# Patient Record
Sex: FEMALE | Race: WHITE | NOT HISPANIC OR LATINO | URBAN - METROPOLITAN AREA
[De-identification: names, ages, dates, MRNs, and addresses within clinical notes are randomized per-mention and may not be internally consistent; named-entity substitution may affect disease eponyms.]

---

## 2021-04-12 ENCOUNTER — IMPORTED ENCOUNTER (OUTPATIENT)
Dept: URBAN - METROPOLITAN AREA CLINIC 59 | Facility: CLINIC | Age: 71
End: 2021-04-12

## 2021-04-12 PROBLEM — H25.11 NUCLEAR SCLEROSIS CATARACT OF RT EYE: Noted: 2021-04-12

## 2021-04-12 PROBLEM — H25.12 NUCLEAR SCLEROSIS CATARACT OF LT EYE: Noted: 2021-04-12

## 2021-04-12 PROBLEM — H43.393 OTHER VITREOUS OPACITIES, BILATERAL: Noted: 2021-04-12

## 2021-04-12 PROCEDURE — 92136 OPHTHALMIC BIOMETRY: CPT

## 2021-04-12 PROCEDURE — 92004 COMPRE OPH EXAM NEW PT 1/>: CPT

## 2021-05-12 ENCOUNTER — IMPORTED ENCOUNTER (OUTPATIENT)
Dept: URBAN - METROPOLITAN AREA CLINIC 59 | Facility: CLINIC | Age: 71
End: 2021-05-12

## 2021-05-12 PROCEDURE — 66984 XCAPSL CTRC RMVL W/O ECP: CPT

## 2021-05-13 ENCOUNTER — IMPORTED ENCOUNTER (OUTPATIENT)
Dept: URBAN - METROPOLITAN AREA CLINIC 59 | Facility: CLINIC | Age: 71
End: 2021-05-13

## 2021-05-13 PROBLEM — Z96.1 PRESENCE OF PSEUDOPHAKIA: Noted: 2021-05-13

## 2021-05-13 PROCEDURE — 99024 POSTOP FOLLOW-UP VISIT: CPT

## 2021-05-13 PROCEDURE — 92136 OPHTHALMIC BIOMETRY: CPT

## 2021-05-19 ENCOUNTER — IMPORTED ENCOUNTER (OUTPATIENT)
Dept: URBAN - METROPOLITAN AREA CLINIC 59 | Facility: CLINIC | Age: 71
End: 2021-05-19

## 2021-05-19 PROCEDURE — 66984 XCAPSL CTRC RMVL W/O ECP: CPT

## 2021-05-20 ENCOUNTER — IMPORTED ENCOUNTER (OUTPATIENT)
Dept: URBAN - METROPOLITAN AREA CLINIC 59 | Facility: CLINIC | Age: 71
End: 2021-05-20

## 2021-05-20 PROBLEM — Z96.1 PRESENCE OF PSEUDOPHAKIA: Noted: 2021-05-20

## 2021-05-20 PROCEDURE — 99024 POSTOP FOLLOW-UP VISIT: CPT

## 2021-11-11 ENCOUNTER — IMPORTED ENCOUNTER (OUTPATIENT)
Dept: URBAN - METROPOLITAN AREA CLINIC 59 | Facility: CLINIC | Age: 71
End: 2021-11-11

## 2021-11-11 PROBLEM — H26.493 OTHER SECONDARY CATARACT, BILATERAL: Noted: 2021-11-11

## 2021-11-11 PROBLEM — H43.393 OTHER VITREOUS OPACITIES, BILATERAL: Noted: 2021-11-11

## 2021-11-11 PROCEDURE — 92014 COMPRE OPH EXAM EST PT 1/>: CPT

## 2023-01-19 ENCOUNTER — ESTABLISHED COMPREHENSIVE EXAM (OUTPATIENT)
Dept: URBAN - METROPOLITAN AREA CLINIC 22 | Facility: CLINIC | Age: 73
End: 2023-01-19

## 2023-01-19 DIAGNOSIS — H43.393: ICD-10-CM

## 2023-01-19 DIAGNOSIS — H26.493: ICD-10-CM

## 2023-01-19 PROCEDURE — 92014 COMPRE OPH EXAM EST PT 1/>: CPT

## 2023-01-19 ASSESSMENT — TONOMETRY
OD_IOP_MMHG: 14
OS_IOP_MMHG: 14

## 2023-01-19 ASSESSMENT — VISUAL ACUITY
OS_SC: 20/30+2
OD_SC: 20/40+1
OD_PH: 20/30+1

## 2023-10-15 ASSESSMENT — VISUAL ACUITY
OD_SC: J2
OD_CC: 20/50
OD_SC: J2
OS_SC: 20/100
OD_SC: 20/300
OD_SC: 20/150
OS_SC: J2
OS_SC: 20/200
OS_SC: 20/30-1
OD_BAT: 20/400
OD_SC: 20/50+2
OS_CC: 20/40
OD_SC: 20/25-2
OS_BAT: 20/400

## 2023-10-15 ASSESSMENT — KERATOMETRY
OD_AXISANGLE2_DEGREES: 66
OS_K1POWER_DIOPTERS: 41.59
OS_K2POWER_DIOPTERS: 42.75
OS_AXISANGLE2_DEGREES: 117
OD_AXISANGLE_DEGREES: 156
OS_AXISANGLE_DEGREES: 27
OD_K1POWER_DIOPTERS: 41.46
OD_K2POWER_DIOPTERS: 42.03

## 2023-10-15 ASSESSMENT — TONOMETRY
OD_IOP_MMHG: 18
OD_IOP_MMHG: 16
OD_IOP_MMHG: 17
OS_IOP_MMHG: 16
OS_IOP_MMHG: 18

## 2024-03-11 ENCOUNTER — ESTABLISHED COMPREHENSIVE EXAM (OUTPATIENT)
Dept: URBAN - METROPOLITAN AREA CLINIC 22 | Facility: CLINIC | Age: 74
End: 2024-03-11

## 2024-03-11 DIAGNOSIS — H26.493: ICD-10-CM

## 2024-03-11 DIAGNOSIS — H43.393: ICD-10-CM

## 2024-03-11 DIAGNOSIS — H04.123: ICD-10-CM

## 2024-03-11 PROCEDURE — 92014 COMPRE OPH EXAM EST PT 1/>: CPT

## 2024-03-11 ASSESSMENT — TONOMETRY
OD_IOP_MMHG: 14
OS_IOP_MMHG: 14

## 2024-03-11 ASSESSMENT — VISUAL ACUITY
OS_PH: 20/20-2
OD_PH: 20/25+2
OS_SC: 20/25
OD_SC: 20/40

## 2025-07-08 ENCOUNTER — OFFICE VISIT (OUTPATIENT)
Dept: URGENT CARE | Facility: CLINIC | Age: 75
End: 2025-07-08
Payer: MEDICARE

## 2025-07-08 ENCOUNTER — APPOINTMENT (OUTPATIENT)
Dept: RADIOLOGY | Facility: CLINIC | Age: 75
End: 2025-07-08
Attending: PHYSICIAN ASSISTANT
Payer: MEDICARE

## 2025-07-08 VITALS
DIASTOLIC BLOOD PRESSURE: 66 MMHG | RESPIRATION RATE: 18 BRPM | TEMPERATURE: 97.2 F | HEART RATE: 84 BPM | SYSTOLIC BLOOD PRESSURE: 128 MMHG | OXYGEN SATURATION: 99 % | WEIGHT: 165 LBS

## 2025-07-08 DIAGNOSIS — M79.641 RIGHT HAND PAIN: Primary | ICD-10-CM

## 2025-07-08 DIAGNOSIS — M79.641 RIGHT HAND PAIN: ICD-10-CM

## 2025-07-08 PROCEDURE — 99203 OFFICE O/P NEW LOW 30 MIN: CPT | Performed by: PHYSICIAN ASSISTANT

## 2025-07-08 PROCEDURE — G0463 HOSPITAL OUTPT CLINIC VISIT: HCPCS | Performed by: PHYSICIAN ASSISTANT

## 2025-07-08 PROCEDURE — 73130 X-RAY EXAM OF HAND: CPT

## 2025-07-08 RX ORDER — MELOXICAM 7.5 MG/1
7.5 TABLET ORAL DAILY
Qty: 30 TABLET | Refills: 0 | Status: SHIPPED | OUTPATIENT
Start: 2025-07-08

## 2025-07-08 RX ORDER — LEVOTHYROXINE SODIUM 100 UG/1
1 TABLET ORAL DAILY
COMMUNITY
Start: 2025-05-10

## 2025-07-08 RX ORDER — ATORVASTATIN CALCIUM 20 MG/1
1 TABLET, FILM COATED ORAL DAILY
COMMUNITY
Start: 2025-05-25

## 2025-07-08 NOTE — PROGRESS NOTES
[facility]  Name: Toña Graham      : 1950      MRN: [id number]  Encounter Provider: Mary Ellen Bullard PA-C  Encounter Date: 2025   Encounter department: [facility]  :  Assessment & Plan  Right hand pain    Orders:    XR hand 3+ vw right; Future    meloxicam (Mobic) 7.5 mg tablet; Take 1 tablet (7.5 mg total) by mouth daily    Recommend x-ray for further evaluation. X-ray with exostosis in the 4th MCP joint, likely arthritis flare. Recommend meloxicam, ice, compression (placed in 2 inch ACE in clinic) and elevation to treat. PT is on vacation in the area, recommend follow-up with PCP and/or orthopedic specialist upon return home.     Patient Instructions  Follow up with PCP in 3-5 days.  Proceed to  ER if symptoms worsen.    If tests are performed, our office will contact you with results only if changes need to made to the care plan discussed with you at the visit. [facility]    Chief Complaint:   Chief Complaint   Patient presents with    Hand Pain     Pt here for hand pain taht started 2 days ago when she thinks she hit it and has been swelling and swelling up[ fore arm and gets sensations going up arm. Hard to move ring fingers      History of Present Illness   75 year old female presents with complaint of right hand swelling and pain x 2 days, pt reports symptoms were more sever on . History of pseudogout. Believes she may have banged her hand against something. Taking Tylenol arthritis with mild relief. Painful to range fingers.     Hand Pain           Review of Systems   Musculoskeletal:  Positive for arthralgias and joint swelling.     Past Medical History   Past Medical History[1]  Past Surgical History[2]  Family History[3]  she   Current Outpatient Medications   Medication Instructions    atorvastatin (LIPITOR) 20 mg tablet 1 tablet, Daily    levothyroxine 100 mcg tablet 1 tablet, Daily    meloxicam (MOBIC) 7.5  "mg, Oral, Daily   Allergies[4]     Objective   /66   Pulse 84   Temp (!) 97.2 °F (36.2 °C) (Tympanic)   Resp 18   Wt 74.8 kg (165 lb)   SpO2 99%      Physical Exam  Vitals and nursing note reviewed.   Constitutional:       General: She is awake. She is not in acute distress.  HENT:      Head: Normocephalic and atraumatic.      Right Ear: Hearing and external ear normal.      Left Ear: Hearing and external ear normal.      Nose: No nasal deformity.      Mouth/Throat:      Lips: Pink. No lesions.     Musculoskeletal:      Comments: Moderate swelling with mild erythema over the right 4th MCP joint. There is associated TTP, limited ROM secondary to swelling and pain. Sensation grossly intact to all digits, capillary refill < 2 seconds.      Skin:     Coloration: Skin is not pale.     Neurological:      Mental Status: She is alert and easily aroused.     Psychiatric:         Attention and Perception: Attention and perception normal.         Mood and Affect: Mood and affect normal.         Behavior: Behavior normal. Behavior is cooperative.         Portions of the record may have been created with voice recognition software.  Occasional wrong word or \"sound a like\" substitutions may have occurred due to the inherent limitations of voice recognition software.  Read the chart carefully and recognize, using context, where substitutions have occurred.       [1] No past medical history on file.  [2] No past surgical history on file.  [3] No family history on file.  [4]   Allergies  Allergen Reactions    Theophylline Rash     "

## 2025-07-08 NOTE — PATIENT INSTRUCTIONS
Take meloxicam as prescribed.  Continue Tylenol arthritis as needed.  Ice 20 minutes on 20 minutes off as needed.  Follow-up with PCP or hand specialist when return home if symptoms are persisting.  If symptoms worsen or new symptoms develop such as fever or worsening redness report to the emergency room for further evaluation.